# Patient Record
Sex: MALE | Race: BLACK OR AFRICAN AMERICAN | NOT HISPANIC OR LATINO | URBAN - METROPOLITAN AREA
[De-identification: names, ages, dates, MRNs, and addresses within clinical notes are randomized per-mention and may not be internally consistent; named-entity substitution may affect disease eponyms.]

---

## 2020-07-25 ENCOUNTER — TELEPHONE ENCOUNTER (OUTPATIENT)
Dept: URBAN - METROPOLITAN AREA CLINIC 13 | Facility: CLINIC | Age: 65
End: 2020-07-25

## 2020-07-25 RX ORDER — POLYETHYLENE GLYCOL 3350, SODIUM SULFATE, SODIUM CHLORIDE, POTASSIUM CHLORIDE, ASCORBIC ACID, SODIUM ASCORBATE 7.5-2.691G
TAKE 32 OZ AS DIRECTED 5:00PM THE EVENING BEFORE AND 6HR PRIOR TO PROCEDURE KIT ORAL
Qty: 1 | Refills: 0 | OUTPATIENT
Start: 2016-07-05 | End: 2016-07-08

## 2020-07-25 RX ORDER — HYDROCODONE BITARTRATE AND ACETAMINOPHEN 5; 325 MG/1; MG/1
TAKE 1 TABLET 3 TIMES DAILY PRN PAIN TABLET ORAL
Qty: 42 | Refills: 0 | OUTPATIENT
End: 2016-07-08

## 2020-07-25 RX ORDER — PANTOPRAZOLE SODIUM 40 MG/1
TAKE 1 TABLET TWICE DAILY TABLET, DELAYED RELEASE ORAL
Qty: 60 | Refills: 5 | OUTPATIENT
Start: 2016-04-26 | End: 2016-07-08

## 2020-07-25 RX ORDER — PROMETHAZINE HYDROCHLORIDE 25 MG/1
TAKE 1 TABLET EVERY 6 HOURS AS NEEDED FOR NAUSEA TABLET ORAL
Qty: 90 | Refills: 0 | OUTPATIENT
End: 2016-07-08

## 2020-07-25 RX ORDER — ONDANSETRON 8 MG/1
TAKE 1 TABLET 3 TIMES DAILY AS NEEDED NAUSEA BEFORE MEALS TAKE 30MINUTES BEF ORE MEALS TABLET ORAL
Qty: 30 | Refills: 0 | OUTPATIENT
End: 2016-07-08

## 2020-07-25 RX ORDER — POLYETHYLENE GLYCOL 3350, SODIUM SULFATE, SODIUM CHLORIDE, POTASSIUM CHLORIDE, ASCORBIC ACID, SODIUM ASCORBATE 7.5-2.691G
TAKE 32 OZ AS DIRECTED 5:00PM THE EVENING BEFORE AND 6HR PRIOR TO PROCEDURE KIT ORAL
Qty: 1 | Refills: 0 | OUTPATIENT
Start: 2016-05-17 | End: 2016-05-20

## 2020-07-26 ENCOUNTER — TELEPHONE ENCOUNTER (OUTPATIENT)
Dept: URBAN - METROPOLITAN AREA CLINIC 13 | Facility: CLINIC | Age: 65
End: 2020-07-26

## 2020-07-26 RX ORDER — IBUPROFEN 800 MG/1
TAKE 1 TABLET 3 TIMES A WEEK FOR PAIN TABLET ORAL
Refills: 0 | Status: ACTIVE | COMMUNITY

## 2021-08-27 ENCOUNTER — TELEPHONE ENCOUNTER (OUTPATIENT)
Dept: URBAN - METROPOLITAN AREA CLINIC 107 | Facility: CLINIC | Age: 66
End: 2021-08-27

## 2021-09-01 ENCOUNTER — OFFICE VISIT (OUTPATIENT)
Dept: URBAN - METROPOLITAN AREA CLINIC 113 | Facility: CLINIC | Age: 66
End: 2021-09-01
Payer: MEDICARE

## 2021-09-01 ENCOUNTER — DASHBOARD ENCOUNTERS (OUTPATIENT)
Age: 66
End: 2021-09-01

## 2021-09-01 ENCOUNTER — LAB OUTSIDE AN ENCOUNTER (OUTPATIENT)
Dept: URBAN - METROPOLITAN AREA CLINIC 113 | Facility: CLINIC | Age: 66
End: 2021-09-01

## 2021-09-01 ENCOUNTER — WEB ENCOUNTER (OUTPATIENT)
Dept: URBAN - METROPOLITAN AREA CLINIC 113 | Facility: CLINIC | Age: 66
End: 2021-09-01

## 2021-09-01 VITALS
WEIGHT: 170 LBS | HEIGHT: 71 IN | DIASTOLIC BLOOD PRESSURE: 86 MMHG | RESPIRATION RATE: 20 BRPM | TEMPERATURE: 98.4 F | SYSTOLIC BLOOD PRESSURE: 137 MMHG | BODY MASS INDEX: 23.8 KG/M2 | HEART RATE: 66 BPM

## 2021-09-01 DIAGNOSIS — R93.3 ABNORMAL CT SCAN, COLON: ICD-10-CM

## 2021-09-01 DIAGNOSIS — R10.11 RUQ ABDOMINAL PAIN: ICD-10-CM

## 2021-09-01 DIAGNOSIS — R11.0 NAUSEA: ICD-10-CM

## 2021-09-01 DIAGNOSIS — K85.00 IDIOPATHIC ACUTE PANCREATITIS WITHOUT INFECTION OR NECROSIS: ICD-10-CM

## 2021-09-01 PROCEDURE — 99204 OFFICE O/P NEW MOD 45 MIN: CPT | Performed by: INTERNAL MEDICINE

## 2021-09-01 RX ORDER — SODIUM, POTASSIUM,MAG SULFATES 17.5-3.13G
354 ML SOLUTION, RECONSTITUTED, ORAL ORAL ONCE
Qty: 354 ML | Refills: 0 | OUTPATIENT
Start: 2021-09-01 | End: 2021-09-02

## 2021-09-01 RX ORDER — IBUPROFEN 800 MG/1
TAKE 1 TABLET 3 TIMES A WEEK FOR PAIN TABLET ORAL
Refills: 0 | Status: ACTIVE | COMMUNITY

## 2021-09-01 RX ORDER — AMLODIPINE BESYLATE AND BENAZEPRIL HYDROCHLORIDE 5; 20 MG/1; MG/1
AS DIRECTED CAPSULE ORAL
Status: ACTIVE | COMMUNITY

## 2021-09-01 RX ORDER — TRAMADOL HYDROCHLORIDE 50 MG/1
1 TABLET AS NEEDED TABLET, FILM COATED ORAL
Status: ACTIVE | COMMUNITY

## 2021-09-01 RX ORDER — FAMOTIDINE 20 MG/1
1 TABLET AT BEDTIME AS NEEDED TABLET, FILM COATED ORAL ONCE A DAY
Status: ACTIVE | COMMUNITY

## 2021-09-01 RX ORDER — ONDANSETRON 4 MG/1
1 TABLET ON THE TONGUE AND ALLOW TO DISSOLVE TABLET, ORALLY DISINTEGRATING ORAL
Qty: 30 | Refills: 1 | OUTPATIENT
Start: 2021-09-01

## 2021-09-01 RX ORDER — HYDROCORTISONE 1 G/100G
30 ML WITH MEALS AND AT BEDTIME LOTION TOPICAL
Status: ACTIVE | COMMUNITY

## 2021-09-01 NOTE — PHYSICAL EXAM GASTROINTESTINAL
Abdomen,  soft, mild RUQ abdominal tenderness, nondistended,  no guarding or rigidity,  no masses palpable,  normal bowel sounds

## 2021-09-01 NOTE — HPI-TODAY'S VISIT:
Mr. Contreras is a 66-year-old gentleman with history of hypertension, referred by Dr. Hall, presenting for evaluation of acute pancreatitis. A copy of this document will be sent to referring provider.  He was initially seen by Dr. Montaño in 2016 for evaluation of abdominal pain.  He had a CT abdomen and pelvis without contrast in the ER which demonstrated multiple well-circumscribed fluid density lesions in the liver which are increased in size and number from prior study the largest measuring 1.9 cm. He has also been followed by Dr. Montaño in the past for colon cancer screening.  Most recent colonoscopy performed on 7/8/2016  was unremarkable.  Repeat colonoscopy is recommended in 2026. Patient states that he was seen in the [facility] emergency department as well as the [facility] emergency department on 8/25/2021 with complaints of abdominal pain, nausea and a headache.  Work-up in the [facility] emergency department included labs which demonstrated glucose 129.57, potassium 4.2, BUN 15, creatinine 0.96, calcium 9.1, T bili 2.1, AST 63, alk phos 81.42, ALT 31; lipase 46; WBC 11.7, RBC 6.86, hemoglobin 15.8, hematocrit 49.5, platelets 206.  He had a CT abdomen and pelvis with contrast which showed a hyperdensity within the rectum which could reflect the internal hemorrhoids or bleeding.  Recommend correlation with direct visualization/colonoscopy; stable right adrenal adenoma measuring up to 3.6 cm; innumerable scattered hepatic cysts with the largest measuring up to 2.8 cm; nonobstructing right renal calculus within the inferior pole measuring up to 8 mm; a small fat filled umbilical hernia with aperture measuring 1.0 cm.  He received IV fluids, pain control and antiemetics and was same day. He presented to his primary care provider on 8/27/2021 with similar complaints.  Labs at that time revealed lipase 212, amylase 286.  CMP was relatively unremarkable aside from elevated T bili of 2.5.  He had a relatively unremarkable CBC.  He also had additional imaging performed. Abdominal ultrasound revealed multiple hepatic cysts, nonobstructing right nephrolithiasis, and grossly stable 3.7 cm right adrenal lesion, previously characterized as an adenoma.  Abdominal x-ray was unremarkable. Today he reports persistent nausea, a mild headache and nonradiating generalized abdominal discomfort.  He states that his symptoms are unrelated to meals.  His symptoms do not improve with defecation.  He is having 1-2 loose bowel movements daily without red blood per rectum or melena.  He does report some chills but denies any fevers.  He is compliant with Pedialyte and Powerade.   Since his last office visit he denies any changes in his medical history.  He states that he did have a procedure for BPH some years ago.  He denies alcohol consumption.  He does not take over-the-counter vitamins or supplements.  He denies NSAID use, and uses Tylenol as needed for pain.

## 2021-09-02 LAB
A/G RATIO: 1.7
ALBUMIN: 3.9
ALKALINE PHOSPHATASE: 106
ALT (SGPT): 17
AST (SGOT): 15
BASO (ABSOLUTE): 0
BASOS: 1
BILIRUBIN, TOTAL: 0.3
BUN/CREATININE RATIO: 14
BUN: 14
C-REACTIVE PROTEIN, QUANT: 24
CALCIUM: 8.8
CARBON DIOXIDE, TOTAL: 29
CHLORIDE: 102
CREATININE: 0.97
EGFR IF AFRICN AM: 94
EGFR IF NONAFRICN AM: 81
EOS (ABSOLUTE): 0.1
EOS: 1
GLOBULIN, TOTAL: 2.3
GLUCOSE: 98
HEMATOCRIT: 44
HEMATOLOGY COMMENTS:: (no result)
HEMOGLOBIN: 13.6
IMMATURE CELLS: (no result)
IMMATURE GRANS (ABS): 0
IMMATURE GRANULOCYTES: 0
LIPASE: 30
LYMPHS (ABSOLUTE): 2.4
LYMPHS: 31
MCH: 23.1
MCHC: 30.9
MCV: 75
MONOCYTES(ABSOLUTE): 1
MONOCYTES: 13
NEUTROPHILS (ABSOLUTE): 4.2
NEUTROPHILS: 54
NRBC: (no result)
PLATELETS: 169
POTASSIUM: 4.4
PROTEIN, TOTAL: 6.2
RBC: 5.89
RDW: 13.6
SODIUM: 142
WBC: 7.8

## 2021-09-03 ENCOUNTER — TELEPHONE ENCOUNTER (OUTPATIENT)
Dept: URBAN - METROPOLITAN AREA CLINIC 113 | Facility: CLINIC | Age: 66
End: 2021-09-03

## 2021-09-03 RX ORDER — PROMETHAZINE HYDROCHLORIDE 12.5 MG/1
1 TABLET AS NEEDED TABLET ORAL
Qty: 40 TABLET | Refills: 0 | OUTPATIENT

## 2021-09-15 ENCOUNTER — CLAIMS CREATED FROM THE CLAIM WINDOW (OUTPATIENT)
Dept: URBAN - METROPOLITAN AREA CLINIC 4 | Facility: CLINIC | Age: 66
End: 2021-09-15
Payer: MEDICARE

## 2021-09-15 ENCOUNTER — OFFICE VISIT (OUTPATIENT)
Dept: URBAN - METROPOLITAN AREA SURGERY CENTER 25 | Facility: SURGERY CENTER | Age: 66
End: 2021-09-15
Payer: MEDICARE

## 2021-09-15 DIAGNOSIS — R93.3 ABN FINDINGS-GI TRACT: ICD-10-CM

## 2021-09-15 DIAGNOSIS — K63.5 BENIGN COLON POLYP: ICD-10-CM

## 2021-09-15 DIAGNOSIS — K63.89 POLYP, HYPERPLASTIC: ICD-10-CM

## 2021-09-15 DIAGNOSIS — K62.1 ANAL AND RECTAL POLYP: ICD-10-CM

## 2021-09-15 PROCEDURE — 45385 COLONOSCOPY W/LESION REMOVAL: CPT | Performed by: INTERNAL MEDICINE

## 2021-09-15 PROCEDURE — 88305 TISSUE EXAM BY PATHOLOGIST: CPT | Performed by: PATHOLOGY

## 2021-09-15 PROCEDURE — 45380 COLONOSCOPY AND BIOPSY: CPT | Performed by: INTERNAL MEDICINE

## 2021-09-15 PROCEDURE — G8907 PT DOC NO EVENTS ON DISCHARG: HCPCS | Performed by: INTERNAL MEDICINE

## 2021-09-15 RX ORDER — ONDANSETRON 4 MG/1
1 TABLET ON THE TONGUE AND ALLOW TO DISSOLVE TABLET, ORALLY DISINTEGRATING ORAL
Qty: 30 | Refills: 1 | Status: ACTIVE | COMMUNITY
Start: 2021-09-01

## 2021-09-15 RX ORDER — PROMETHAZINE HYDROCHLORIDE 12.5 MG/1
1 TABLET AS NEEDED TABLET ORAL
Qty: 40 TABLET | Refills: 0 | Status: ACTIVE | COMMUNITY

## 2021-09-15 RX ORDER — IBUPROFEN 800 MG/1
TAKE 1 TABLET 3 TIMES A WEEK FOR PAIN TABLET ORAL
Refills: 0 | Status: ACTIVE | COMMUNITY

## 2021-09-15 RX ORDER — TRAMADOL HYDROCHLORIDE 50 MG/1
1 TABLET AS NEEDED TABLET, FILM COATED ORAL
Status: ACTIVE | COMMUNITY

## 2021-09-15 RX ORDER — AMLODIPINE BESYLATE AND BENAZEPRIL HYDROCHLORIDE 5; 20 MG/1; MG/1
AS DIRECTED CAPSULE ORAL
Status: ACTIVE | COMMUNITY

## 2021-09-15 RX ORDER — HYDROCORTISONE 1 G/100G
30 ML WITH MEALS AND AT BEDTIME LOTION TOPICAL
Status: ACTIVE | COMMUNITY

## 2021-09-15 RX ORDER — FAMOTIDINE 20 MG/1
1 TABLET AT BEDTIME AS NEEDED TABLET, FILM COATED ORAL ONCE A DAY
Status: ACTIVE | COMMUNITY

## 2021-10-08 ENCOUNTER — OFFICE VISIT (OUTPATIENT)
Dept: URBAN - METROPOLITAN AREA CLINIC 113 | Facility: CLINIC | Age: 66
End: 2021-10-08

## 2021-11-09 ENCOUNTER — OFFICE VISIT (OUTPATIENT)
Dept: URBAN - METROPOLITAN AREA CLINIC 113 | Facility: CLINIC | Age: 66
End: 2021-11-09

## 2022-11-09 ENCOUNTER — OFFICE VISIT (OUTPATIENT)
Dept: URBAN - METROPOLITAN AREA CLINIC 113 | Facility: CLINIC | Age: 67
End: 2022-11-09

## 2023-05-01 ENCOUNTER — TELEPHONE ENCOUNTER (OUTPATIENT)
Dept: URBAN - METROPOLITAN AREA CLINIC 113 | Facility: CLINIC | Age: 68
End: 2023-05-01

## 2023-05-01 RX ORDER — ONDANSETRON 4 MG/1
1 TABLET ON THE TONGUE AND ALLOW TO DISSOLVE TABLET, ORALLY DISINTEGRATING ORAL
Qty: 30 | Refills: 1
Start: 2021-09-01

## 2025-04-01 ENCOUNTER — OFFICE VISIT (OUTPATIENT)
Dept: URBAN - METROPOLITAN AREA CLINIC 72 | Facility: CLINIC | Age: 70
End: 2025-04-01